# Patient Record
Sex: MALE | Race: WHITE | NOT HISPANIC OR LATINO | Employment: FULL TIME | URBAN - NONMETROPOLITAN AREA
[De-identification: names, ages, dates, MRNs, and addresses within clinical notes are randomized per-mention and may not be internally consistent; named-entity substitution may affect disease eponyms.]

---

## 2019-04-25 RX ORDER — SIMVASTATIN 40 MG
40 TABLET ORAL NIGHTLY
COMMUNITY

## 2019-04-25 RX ORDER — LISINOPRIL AND HYDROCHLOROTHIAZIDE 20; 12.5 MG/1; MG/1
1 TABLET ORAL DAILY
COMMUNITY

## 2019-04-25 RX ORDER — EZETIMIBE 10 MG/1
10 TABLET ORAL DAILY
COMMUNITY

## 2019-06-04 ENCOUNTER — OFFICE VISIT (OUTPATIENT)
Dept: GASTROENTEROLOGY | Facility: CLINIC | Age: 49
End: 2019-06-04

## 2019-06-04 VITALS
HEART RATE: 103 BPM | WEIGHT: 237 LBS | TEMPERATURE: 96.9 F | DIASTOLIC BLOOD PRESSURE: 86 MMHG | HEIGHT: 74 IN | OXYGEN SATURATION: 100 % | SYSTOLIC BLOOD PRESSURE: 130 MMHG | BODY MASS INDEX: 30.42 KG/M2

## 2019-06-04 DIAGNOSIS — Z12.11 ENCOUNTER FOR SCREENING FOR MALIGNANT NEOPLASM OF COLON: Primary | ICD-10-CM

## 2019-06-04 PROCEDURE — S0285 CNSLT BEFORE SCREEN COLONOSC: HCPCS | Performed by: NURSE PRACTITIONER

## 2019-06-04 RX ORDER — MULTIPLE VITAMINS W/ MINERALS TAB 9MG-400MCG
1 TAB ORAL DAILY
COMMUNITY

## 2019-06-04 NOTE — H&P (VIEW-ONLY)
[facility] Gastroenterology    Primary Physician Dominic Zelaya MD      6/4/2019    Gerardo Otero   1970      Chief Complaint   Patient presents with   • Colonoscopy       Subjective     HPI    Gerardo Otero is a 49 y.o. male who presents as a referral for preventative maintenance. He has no complaints of nausea or vomiting. No change in bowels. No wt loss. No BRBPR. No melena. No abdominal pain.         He has never had a colonoscopy does not.  The patient does not have history of colon polyps.  He is adopted and does not have any information regarding biological family.    Past Medical History:   Diagnosis Date   • Hyperlipidemia    • Hypertension        Past Surgical History:   Procedure Laterality Date   • APPENDECTOMY     • TOTAL HIP ARTHROPLASTY      right       Outpatient Medications Marked as Taking for the 6/4/19 encounter (Office Visit) with Dipika Whaley APRN   Medication Sig Dispense Refill   • ezetimibe (ZETIA) 10 MG tablet Take 10 mg by mouth Daily.     • lisinopril-hydrochlorothiazide (PRINZIDE,ZESTORETIC) 20-12.5 MG per tablet Take 1 tablet by mouth Daily.     • Multiple Vitamins-Minerals (MULTIVITAMIN WITH MINERALS) tablet tablet Take 1 tablet by mouth Daily.     • Omega-3 Fatty Acids (FISH OIL PO) Take  by mouth Daily.     • simvastatin (ZOCOR) 40 MG tablet Take 40 mg by mouth Every Night.         Allergies no known allergies    Social History     Socioeconomic History   • Marital status:      Spouse name: Not on file   • Number of children: Not on file   • Years of education: Not on file   • Highest education level: Not on file   Tobacco Use   • Smoking status: Never Smoker   • Smokeless tobacco: Former User     Types: Snuff   Substance and Sexual Activity   • Alcohol use: Yes     Comment: daily   • Drug use: No   • Sexual activity: Defer       No family history on file.    Review of Systems   Constitutional: Negative for appetite change, chills, fatigue, fever  and unexpected weight change.   HENT: Negative for sore throat and trouble swallowing.    Eyes: Negative for visual disturbance.   Respiratory: Negative for cough, chest tightness, shortness of breath and wheezing.    Cardiovascular: Negative for chest pain and palpitations.   Gastrointestinal: Negative for abdominal distention, abdominal pain, anal bleeding, blood in stool, constipation, diarrhea, nausea, rectal pain and vomiting.        As mentioned in hpi   Genitourinary: Negative for difficulty urinating and hematuria.   Musculoskeletal: Negative for arthralgias and back pain.   Skin: Negative for color change and rash.   Neurological: Negative for dizziness, seizures, syncope, light-headedness and headaches.   Hematological: Negative for adenopathy.   Psychiatric/Behavioral: Negative for confusion. The patient is not nervous/anxious.        Objective     Vitals:    06/04/19 1359   BP: 130/86   Pulse: 103   Temp: 96.9 °F (36.1 °C)   SpO2: 100%         06/04/19  1359   Weight: 108 kg (237 lb)     Body mass index is 30.43 kg/m².    Physical Exam   Constitutional: He appears well-developed and well-nourished. No distress.   HENT:   Head: Normocephalic and atraumatic.   Eyes: EOM are normal. No scleral icterus.   Neck: Neck supple. No JVD present.   Cardiovascular: Normal rate, regular rhythm and normal heart sounds.   Pulmonary/Chest: Effort normal and breath sounds normal.   Abdominal: Soft. Bowel sounds are normal. He exhibits no distension. There is no tenderness.   Musculoskeletal: Normal range of motion. He exhibits no deformity.   Neurological: He is alert.   Skin: Skin is warm and dry. No rash noted.   Psychiatric: He has a normal mood and affect. His behavior is normal.   Vitals reviewed.      Imaging Results (most recent)     None          Assessment/Plan     Gerardo was seen today for colonoscopy.    Diagnoses and all orders for this visit:    Encounter for screening for malignant neoplasm of colon  -      Case Request; Standing  -     Case Request    Other orders  -     Follow Anesthesia Guidelines / Standing Orders; Future  -     Implement Anesthesia Orders Day of Procedure; Standing  -     Obtain Informed Consent; Standing  -     Obtain Informed Consent; Future  -     polyethylene glycol (GOLYTELY) 236 g solution; Take 4,000 mL by mouth 1 (One) Time for 1 dose. Take as directed per instruction sheet.    Plan for colonoscopy. The patient was advised to take any blood pressure or heart  medications the morning of  procedure if that is when he/she normally takes.             Body mass index is 30.43 kg/m².    Patient's Body mass index is 30.43 kg/m². BMI is above normal parameters. Recommendations include: No follow-up, recommend weight loss..      COLONOSCOPY WITH ANESTHESIA (N/A)  All risks, benefits, alternatives, and indications of colonoscopy procedure have been discussed with the patient. Risks to include perforation of the colon requiring possible surgery or colostomy, risk of bleeding from biopsies or removal of colon tissue, possibility of missing a colon polyp or cancer, or adverse drug reaction.  Benefits to include the diagnosis and management of disease of the colon and rectum. Alternatives to include barium enema, radiographic evaluation, lab testing or no intervention. Pt verbalizes understanding and agrees.       NIRANJAN Reyes      EMR Dragon/transcription disclaimer:  Much of this encounter note is electronic transcription/translation of spoken language to printed text.  The electronic translation of spoken language may be erroneous, or at times, nonsensical words or phrases may be inadvertently transcribed.  Although I have reviewed the note for such errors, some may still exist.

## 2019-06-04 NOTE — PROGRESS NOTES
[facility] Gastroenterology    Primary Physician Dominic Zelaya MD      6/4/2019    Gerardo Otero   1970      Chief Complaint   Patient presents with   • Colonoscopy       Subjective     HPI    Gerardo Otero is a 49 y.o. male who presents as a referral for preventative maintenance. He has no complaints of nausea or vomiting. No change in bowels. No wt loss. No BRBPR. No melena. No abdominal pain.         He has never had a colonoscopy does not.  The patient does not have history of colon polyps.  He is adopted and does not have any information regarding biological family.    Past Medical History:   Diagnosis Date   • Hyperlipidemia    • Hypertension        Past Surgical History:   Procedure Laterality Date   • APPENDECTOMY     • TOTAL HIP ARTHROPLASTY      right       Outpatient Medications Marked as Taking for the 6/4/19 encounter (Office Visit) with Dipika Whaley APRN   Medication Sig Dispense Refill   • ezetimibe (ZETIA) 10 MG tablet Take 10 mg by mouth Daily.     • lisinopril-hydrochlorothiazide (PRINZIDE,ZESTORETIC) 20-12.5 MG per tablet Take 1 tablet by mouth Daily.     • Multiple Vitamins-Minerals (MULTIVITAMIN WITH MINERALS) tablet tablet Take 1 tablet by mouth Daily.     • Omega-3 Fatty Acids (FISH OIL PO) Take  by mouth Daily.     • simvastatin (ZOCOR) 40 MG tablet Take 40 mg by mouth Every Night.         Allergies no known allergies    Social History     Socioeconomic History   • Marital status:      Spouse name: Not on file   • Number of children: Not on file   • Years of education: Not on file   • Highest education level: Not on file   Tobacco Use   • Smoking status: Never Smoker   • Smokeless tobacco: Former User     Types: Snuff   Substance and Sexual Activity   • Alcohol use: Yes     Comment: daily   • Drug use: No   • Sexual activity: Defer       No family history on file.    Review of Systems   Constitutional: Negative for appetite change, chills, fatigue, fever  and unexpected weight change.   HENT: Negative for sore throat and trouble swallowing.    Eyes: Negative for visual disturbance.   Respiratory: Negative for cough, chest tightness, shortness of breath and wheezing.    Cardiovascular: Negative for chest pain and palpitations.   Gastrointestinal: Negative for abdominal distention, abdominal pain, anal bleeding, blood in stool, constipation, diarrhea, nausea, rectal pain and vomiting.        As mentioned in hpi   Genitourinary: Negative for difficulty urinating and hematuria.   Musculoskeletal: Negative for arthralgias and back pain.   Skin: Negative for color change and rash.   Neurological: Negative for dizziness, seizures, syncope, light-headedness and headaches.   Hematological: Negative for adenopathy.   Psychiatric/Behavioral: Negative for confusion. The patient is not nervous/anxious.        Objective     Vitals:    06/04/19 1359   BP: 130/86   Pulse: 103   Temp: 96.9 °F (36.1 °C)   SpO2: 100%         06/04/19  1359   Weight: 108 kg (237 lb)     Body mass index is 30.43 kg/m².    Physical Exam   Constitutional: He appears well-developed and well-nourished. No distress.   HENT:   Head: Normocephalic and atraumatic.   Eyes: EOM are normal. No scleral icterus.   Neck: Neck supple. No JVD present.   Cardiovascular: Normal rate, regular rhythm and normal heart sounds.   Pulmonary/Chest: Effort normal and breath sounds normal.   Abdominal: Soft. Bowel sounds are normal. He exhibits no distension. There is no tenderness.   Musculoskeletal: Normal range of motion. He exhibits no deformity.   Neurological: He is alert.   Skin: Skin is warm and dry. No rash noted.   Psychiatric: He has a normal mood and affect. His behavior is normal.   Vitals reviewed.      Imaging Results (most recent)     None          Assessment/Plan     Gerardo was seen today for colonoscopy.    Diagnoses and all orders for this visit:    Encounter for screening for malignant neoplasm of colon  -      Case Request; Standing  -     Case Request    Other orders  -     Follow Anesthesia Guidelines / Standing Orders; Future  -     Implement Anesthesia Orders Day of Procedure; Standing  -     Obtain Informed Consent; Standing  -     Obtain Informed Consent; Future  -     polyethylene glycol (GOLYTELY) 236 g solution; Take 4,000 mL by mouth 1 (One) Time for 1 dose. Take as directed per instruction sheet.    Plan for colonoscopy. The patient was advised to take any blood pressure or heart  medications the morning of  procedure if that is when he/she normally takes.             Body mass index is 30.43 kg/m².    Patient's Body mass index is 30.43 kg/m². BMI is above normal parameters. Recommendations include: No follow-up, recommend weight loss..      COLONOSCOPY WITH ANESTHESIA (N/A)  All risks, benefits, alternatives, and indications of colonoscopy procedure have been discussed with the patient. Risks to include perforation of the colon requiring possible surgery or colostomy, risk of bleeding from biopsies or removal of colon tissue, possibility of missing a colon polyp or cancer, or adverse drug reaction.  Benefits to include the diagnosis and management of disease of the colon and rectum. Alternatives to include barium enema, radiographic evaluation, lab testing or no intervention. Pt verbalizes understanding and agrees.       NIRANJAN Reyes      EMR Dragon/transcription disclaimer:  Much of this encounter note is electronic transcription/translation of spoken language to printed text.  The electronic translation of spoken language may be erroneous, or at times, nonsensical words or phrases may be inadvertently transcribed.  Although I have reviewed the note for such errors, some may still exist.

## 2019-06-21 ENCOUNTER — ANESTHESIA EVENT (OUTPATIENT)
Dept: GASTROENTEROLOGY | Facility: HOSPITAL | Age: 49
End: 2019-06-21

## 2019-06-21 ENCOUNTER — HOSPITAL ENCOUNTER (OUTPATIENT)
Facility: HOSPITAL | Age: 49
Setting detail: HOSPITAL OUTPATIENT SURGERY
Discharge: HOME | End: 2019-06-21
Attending: INTERNAL MEDICINE | Admitting: INTERNAL MEDICINE

## 2019-06-21 ENCOUNTER — ANESTHESIA (OUTPATIENT)
Dept: GASTROENTEROLOGY | Facility: HOSPITAL | Age: 49
End: 2019-06-21

## 2019-06-21 VITALS
SYSTOLIC BLOOD PRESSURE: 121 MMHG | BODY MASS INDEX: 30.22 KG/M2 | TEMPERATURE: 97.1 F | WEIGHT: 228 LBS | RESPIRATION RATE: 18 BRPM | HEIGHT: 73 IN | HEART RATE: 93 BPM | OXYGEN SATURATION: 98 % | DIASTOLIC BLOOD PRESSURE: 82 MMHG

## 2019-06-21 DIAGNOSIS — Z12.11 ENCOUNTER FOR SCREENING FOR MALIGNANT NEOPLASM OF COLON: ICD-10-CM

## 2019-06-21 PROCEDURE — 88305 TISSUE EXAM BY PATHOLOGIST: CPT | Performed by: INTERNAL MEDICINE

## 2019-06-21 PROCEDURE — 25010000002 PROPOFOL 10 MG/ML EMULSION: Performed by: NURSE ANESTHETIST, CERTIFIED REGISTERED

## 2019-06-21 PROCEDURE — 45385 COLONOSCOPY W/LESION REMOVAL: CPT | Performed by: INTERNAL MEDICINE

## 2019-06-21 RX ORDER — PROPOFOL 10 MG/ML
VIAL (ML) INTRAVENOUS AS NEEDED
Status: DISCONTINUED | OUTPATIENT
Start: 2019-06-21 | End: 2019-06-21 | Stop reason: SURG

## 2019-06-21 RX ORDER — LIDOCAINE HYDROCHLORIDE 20 MG/ML
INJECTION, SOLUTION INFILTRATION; PERINEURAL AS NEEDED
Status: DISCONTINUED | OUTPATIENT
Start: 2019-06-21 | End: 2019-06-21 | Stop reason: SURG

## 2019-06-21 RX ORDER — ONDANSETRON 2 MG/ML
4 INJECTION INTRAMUSCULAR; INTRAVENOUS ONCE AS NEEDED
Status: DISCONTINUED | OUTPATIENT
Start: 2019-06-21 | End: 2019-06-21 | Stop reason: HOSPADM

## 2019-06-21 RX ORDER — SODIUM CHLORIDE 0.9 % (FLUSH) 0.9 %
3 SYRINGE (ML) INJECTION AS NEEDED
Status: DISCONTINUED | OUTPATIENT
Start: 2019-06-21 | End: 2019-06-21 | Stop reason: HOSPADM

## 2019-06-21 RX ORDER — SODIUM CHLORIDE 9 MG/ML
500 INJECTION, SOLUTION INTRAVENOUS CONTINUOUS PRN
Status: DISCONTINUED | OUTPATIENT
Start: 2019-06-21 | End: 2019-06-21 | Stop reason: HOSPADM

## 2019-06-21 RX ADMIN — PROPOFOL 200 MG: 10 INJECTION, EMULSION INTRAVENOUS at 10:10

## 2019-06-21 RX ADMIN — SODIUM CHLORIDE 500 ML: 9 INJECTION, SOLUTION INTRAVENOUS at 09:25

## 2019-06-21 RX ADMIN — LIDOCAINE HYDROCHLORIDE 100 MG: 20 INJECTION, SOLUTION INFILTRATION; PERINEURAL at 10:10

## 2019-06-21 NOTE — ANESTHESIA POSTPROCEDURE EVALUATION
"Patient: Gerardo Otero    Procedure Summary     Date:  06/21/19 Room / Location:  [facility] ENDOSCOPY 6 / [facility] ENDOSCOPY    Anesthesia Start:  1007 Anesthesia Stop:  1033    Procedure:  COLONOSCOPY WITH ANESTHESIA (N/A ) Diagnosis:       Encounter for screening for malignant neoplasm of colon      (Encounter for screening for malignant neoplasm of colon [Z12.11])    Surgeon:  Dayne Dhaliwal MD Provider:  Rao Vuong CRNA    Anesthesia Type:  MAC ASA Status:  2          Anesthesia Type: MAC  Last vitals  BP   164/99 (06/21/19 0859)   Temp   97.1 °F (36.2 °C) (06/21/19 0859)   Pulse   109 (06/21/19 0859)   Resp   18 (06/21/19 0859)     SpO2   97 % (06/21/19 0859)     Post Anesthesia Care and Evaluation    Patient location during evaluation: PHASE II  Patient participation: complete - patient participated  Level of consciousness: awake and alert  Pain management: adequate  Airway patency: patent  Anesthetic complications: No anesthetic complications    Cardiovascular status: acceptable  Respiratory status: acceptable  Hydration status: acceptable    Comments: Blood pressure 164/99, pulse 109, temperature 97.1 °F (36.2 °C), temperature source Temporal, resp. rate 18, height 185.4 cm (73\"), weight 103 kg (228 lb), SpO2 97 %.    Pt discharged from PACU based on lisette score >8      "

## 2019-06-21 NOTE — ANESTHESIA PREPROCEDURE EVALUATION
Anesthesia Evaluation     Patient summary reviewed and Nursing notes reviewed   no history of anesthetic complications:  NPO Solid Status: > 8 hours  NPO Liquid Status: > 2 hours           Airway   Mallampati: I  TM distance: >3 FB  Neck ROM: full  No difficulty expected  Dental      Pulmonary    (-) sleep apnea, not a smoker  Cardiovascular   Exercise tolerance: good (4-7 METS)    (+) hypertension, hyperlipidemia,   (-) CAD      Neuro/Psych  (-) seizures, TIA, CVA  GI/Hepatic/Renal/Endo    (-) liver disease, no renal disease, diabetes    Musculoskeletal     Abdominal    Substance History      OB/GYN          Other                        Anesthesia Plan    ASA 2     MAC     intravenous induction   Anesthetic plan, all risks, benefits, and alternatives have been provided, discussed and informed consent has been obtained with: patient.

## 2019-06-24 LAB
CYTO UR: NORMAL
LAB AP CASE REPORT: NORMAL
PATH REPORT.FINAL DX SPEC: NORMAL
PATH REPORT.GROSS SPEC: NORMAL

## 2020-12-07 PROCEDURE — 87635 SARS-COV-2 COVID-19 AMP PRB: CPT | Performed by: NURSE PRACTITIONER

## 2021-03-25 ENCOUNTER — IMMUNIZATION (OUTPATIENT)
Dept: VACCINE CLINIC | Facility: HOSPITAL | Age: 51
End: 2021-03-25

## 2021-03-25 PROCEDURE — 91301 HC SARSCO02 VAC 100MCG/0.5ML IM: CPT | Performed by: OBSTETRICS & GYNECOLOGY

## 2021-03-25 PROCEDURE — 0011A: CPT | Performed by: OBSTETRICS & GYNECOLOGY

## 2021-04-22 ENCOUNTER — APPOINTMENT (OUTPATIENT)
Dept: VACCINE CLINIC | Facility: HOSPITAL | Age: 51
End: 2021-04-22

## 2021-04-24 ENCOUNTER — IMMUNIZATION (OUTPATIENT)
Dept: VACCINE CLINIC | Facility: HOSPITAL | Age: 51
End: 2021-04-24

## 2021-04-24 PROCEDURE — 0012A: CPT | Performed by: OBSTETRICS & GYNECOLOGY

## 2021-04-24 PROCEDURE — 91301 HC SARSCO02 VAC 100MCG/0.5ML IM: CPT | Performed by: OBSTETRICS & GYNECOLOGY

## 2021-11-19 ENCOUNTER — HOSPITAL ENCOUNTER (EMERGENCY)
Facility: HOSPITAL | Age: 51
Discharge: HOME | End: 2021-11-19
Attending: EMERGENCY MEDICINE | Admitting: EMERGENCY MEDICINE

## 2021-11-19 ENCOUNTER — APPOINTMENT (OUTPATIENT)
Dept: CT IMAGING | Facility: HOSPITAL | Age: 51
End: 2021-11-19

## 2021-11-19 VITALS
TEMPERATURE: 98.1 F | HEART RATE: 105 BPM | DIASTOLIC BLOOD PRESSURE: 84 MMHG | SYSTOLIC BLOOD PRESSURE: 146 MMHG | BODY MASS INDEX: 29.77 KG/M2 | RESPIRATION RATE: 18 BRPM | WEIGHT: 232 LBS | HEIGHT: 74 IN | OXYGEN SATURATION: 98 %

## 2021-11-19 DIAGNOSIS — M54.2 NECK PAIN: Primary | ICD-10-CM
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
## 2021-11-19 PROCEDURE — 99283 EMERGENCY DEPT VISIT LOW MDM: CPT

## 2021-11-19 PROCEDURE — 72125 CT NECK SPINE W/O DYE: CPT

## 2021-11-19 PROCEDURE — 70450 CT HEAD/BRAIN W/O DYE: CPT

## 2021-11-19 RX ORDER — KETOROLAC TROMETHAMINE 10 MG/1
10 TABLET, FILM COATED ORAL ONCE
Status: COMPLETED | OUTPATIENT
Start: 2021-11-19 | End: 2021-11-19

## 2021-11-19 RX ADMIN — KETOROLAC TROMETHAMINE 10 MG: 10 TABLET, FILM COATED ORAL at 09:51
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
## 2021-11-22 NOTE — ED NOTES
Hi is patients name available?     Yes- Hey! My name is ______ with the patient relations/ER department at [facility]. I understand you were in the emergency room yesterday and Dr. Physicians name really cares about his/her patients and requested for me to follow up with you.  There are a few questions our physicians and nurses want to know about your experience.     • Do you mind if I ask them? Yes [x] No []  Reasoning:       • Did your nurse introduce himself/herself when they first met you? Yes [x] No []      • Did you physician or PA/APRN introduce himself/herself? Yes [x] No []      • Did our staff treat you with compassion? Yes [x] No []      • If we did any labs or imaging, did we talk about how long it would take to get results? Yes [x] No []      • Were we respectful to you and anyone who was with you? Yes [x] No []      • Did we express care/concern about what brought you into the department? Yes [x] No []      • Is there anyone I can recognize for going above and beyond in their care for you? Yes [] No [x]        [if conversation has been generally positive] --- Thank you for taking the time to take this call. We really do appreciate you. You may get a patient survey in the mail. Our leaders review every single one of them, so if you get one, we hope you'll fill it out for us! We hope you have a wonderful rest of your day.     [if conversation has been negative or patient wants to file formal grievance] I am sorry to hear things didn't go so well for you. We appreciate hearing all feedback. From what I've heard today, I'd like to do some additional follow up with our ER leaders. We don't want other patients to have an experience like yours. Do you mind if I share this with our leadership team?      [if no] Okay. If you change your mind, please call me back. Again, I'm sorry your experience wasn't good. I hope you have a good rest of your day.      [if yes] Thank you for that. I'll follow  back up with you after I've had time to review with the right people. We hope you have a good rest of your day.        Chitra Deleon  11/22/21 1048

## 2024-05-24 ENCOUNTER — TELEPHONE (OUTPATIENT)
Dept: GASTROENTEROLOGY | Facility: CLINIC | Age: 54
End: 2024-05-24

## 2024-05-24 NOTE — TELEPHONE ENCOUNTER
Caller: Gerardo Otero    Relationship to patient: Self    Best call back number: [phone]    Patient is needing: RECEIVED RECALL LETTER IN THE MAIL FOR COLONOSCOPY W/ DR. GÓMEZ. PATIENT NO LONGER RESIDES IN [place] AND HAS ALREADY HAD COLONOSCOPY ON 05/01/24 WITH DR. KATHLEEN @ [facility], AND RECALL IS NO LONGER NEEDED.

## 2025-06-25 ENCOUNTER — TELEPHONE (OUTPATIENT)
Age: 55
End: 2025-06-25

## 2025-06-25 NOTE — TELEPHONE ENCOUNTER
Called patient and let him know that due to patient being over 6 months out from surgery he doesn't need any antibiotics for his dental appointment

## 2025-06-25 NOTE — TELEPHONE ENCOUNTER
Pt is calling in stating he needs to know if he still needs to take an antibiotic when seeing the dentist   Please call pt back with advice

## (undated) DEVICE — Device: Brand: DEFENDO AIR/WATER/SUCTION AND BIOPSY VALVE

## (undated) DEVICE — ENDOGATOR AUXILIARY WATER JET CONNECTOR: Brand: ENDOGATOR

## (undated) DEVICE — YANKAUER,BULB TIP WITH VENT: Brand: ARGYLE

## (undated) DEVICE — SNAR POLYP SENSATION MICRO OVL 13 240X40

## (undated) DEVICE — TBG SMPL FLTR LINE NASL 02/C02 A/ BX/100

## (undated) DEVICE — MASK,OXYGEN,MED CONC,ADLT,7' TUB, UC: Brand: PENDING

## (undated) DEVICE — CUFF,BP,DISP,1 TUBE,ADULT,HP: Brand: MEDLINE

## (undated) DEVICE — THE SINGLE USE ETRAP – POLYP TRAP IS USED FOR SUCTION RETRIEVAL OF ENDOSCOPICALLY REMOVED POLYPS.: Brand: ETRAP

## (undated) DEVICE — SENSR O2 OXIMAX FNGR A/ 18IN NONSTR

## (undated) DEVICE — THE CHANNEL CLEANING BRUSH IS A NYLON FLEXI BRUSH ATTACHED TO A FLEXIBLE PLASTIC SHEATH DESIGNED TO SAFELY REMOVE DEBRIS FROM FLEXIBLE ENDOSCOPES.